# Patient Record
Sex: FEMALE | Race: WHITE | HISPANIC OR LATINO | Employment: UNEMPLOYED | URBAN - METROPOLITAN AREA
[De-identification: names, ages, dates, MRNs, and addresses within clinical notes are randomized per-mention and may not be internally consistent; named-entity substitution may affect disease eponyms.]

---

## 2023-06-14 ENCOUNTER — TRANSFERRED RECORDS (OUTPATIENT)
Dept: MULTI SPECIALTY CLINIC | Facility: CLINIC | Age: 63
End: 2023-06-14

## 2023-07-12 ENCOUNTER — OFFICE VISIT (OUTPATIENT)
Dept: FAMILY MEDICINE | Facility: OTHER | Age: 63
End: 2023-07-12
Attending: NURSE PRACTITIONER
Payer: COMMERCIAL

## 2023-07-12 VITALS
WEIGHT: 181.3 LBS | DIASTOLIC BLOOD PRESSURE: 82 MMHG | TEMPERATURE: 98 F | RESPIRATION RATE: 14 BRPM | SYSTOLIC BLOOD PRESSURE: 134 MMHG | OXYGEN SATURATION: 98 % | HEART RATE: 75 BPM

## 2023-07-12 DIAGNOSIS — R10.2 SUPRAPUBIC DISCOMFORT: Primary | ICD-10-CM

## 2023-07-12 LAB
ALBUMIN UR-MCNC: NEGATIVE MG/DL
APPEARANCE UR: CLEAR
BILIRUB UR QL STRIP: NEGATIVE
COLOR UR AUTO: ABNORMAL
GLUCOSE UR STRIP-MCNC: NEGATIVE MG/DL
HGB UR QL STRIP: ABNORMAL
HYALINE CASTS: 4 /LPF
KETONES UR STRIP-MCNC: NEGATIVE MG/DL
LEUKOCYTE ESTERASE UR QL STRIP: NEGATIVE
MUCOUS THREADS #/AREA URNS LPF: PRESENT /LPF
NITRATE UR QL: NEGATIVE
PH UR STRIP: 5 [PH] (ref 5–9)
RBC URINE: 1 /HPF
SP GR UR STRIP: 1.02 (ref 1–1.03)
UROBILINOGEN UR STRIP-MCNC: NORMAL MG/DL
WBC URINE: 1 /HPF

## 2023-07-12 PROCEDURE — 99203 OFFICE O/P NEW LOW 30 MIN: CPT

## 2023-07-12 PROCEDURE — 87086 URINE CULTURE/COLONY COUNT: CPT | Mod: ZL

## 2023-07-12 PROCEDURE — 81001 URINALYSIS AUTO W/SCOPE: CPT | Mod: ZL

## 2023-07-12 RX ORDER — MULTIVITAMIN/FOLIC ACID/BIOTIN 400-2000
TABLET ORAL DAILY
COMMUNITY
Start: 2023-03-06

## 2023-07-12 RX ORDER — LISINOPRIL AND HYDROCHLOROTHIAZIDE 12.5; 2 MG/1; MG/1
1 TABLET ORAL DAILY
COMMUNITY
Start: 2022-01-07

## 2023-07-12 ASSESSMENT — PAIN SCALES - GENERAL: PAINLEVEL: MODERATE PAIN (4)

## 2023-07-12 NOTE — PROGRESS NOTES
ASSESSMENT/PLAN:    I have reviewed the nursing notes.  I have reviewed the findings, diagnosis, plan and need for follow up with the patient.    1. Suprapubic discomfort  - UA with Microscopic reflex to Culture  - Urine Culture Aerobic Bacterial    Patient presents with suprapubic discomfort.  Her vitals are stable and patient appears nontoxic.  Urinalysis was negative for any signs of infection, discussed with patient in clinic.  Will send urine for culture and will notify patient of results.  If urine culture shows bacterial growth then will send in a prescription for an antibiotic based on culture and sensitivity.  Advised patient that her symptoms could be due to overactive bladder, increase fluid intake, or caffeine intake.  Advised patient to push fluids and may take Tylenol and ibuprofen as needed.  Advised patient that she can also try Azo.  Advised patient that she should avoid caffeine for the next few days to see if this helps improve her symptoms.    Discussed warning signs/symptoms indicative of need to f/u    Follow up if symptoms persist or worsen or concerns    I explained my diagnostic considerations and recommendations to the patient, who voiced understanding and agreement with the treatment plan. All questions were answered. We discussed potential side effects of any prescribed or recommended therapies, as well as expectations for response to treatments.    Nick Bojorquez, JUAN RAMON CNP  7/12/2023  5:30 PM    HPI:    Leisa Pinedo is a 63 year old female  who presents to [facility] today for concerns of urinary symptoms    Patient presents with concerns of possible UTI, x 1 day    Symptoms: urgency, frequency, suprapubic pain and pressure, back pain and nausea  Flank Pain or Back Pain: Yes: middle of back   Blood in Urine: No  Last Urination: in clinic  Able to Completely Urinate/Void: Yes  Prior UTIs: Yes: last one about 4-5 years ago  Exposures to STIs/STDs: No  Fevers, chills, N/V/D: Yes:  nausea  Change in Bowel Habits: No  Vaginal Symptoms or Discharge: No  Recent swimming/use of hot tubs/swimming pools/lakes: No    Treatments tried: none    Denies CP, SOB, calf tenderness. No new medications. Denies exposure to ill or COVID positive patients.     Allergies: perfume    PCP: Dr. Rl Lopez    History reviewed. No pertinent past medical history.  History reviewed. No pertinent surgical history.  Social History     Tobacco Use     Smoking status: Former     Types: Cigarettes     Smokeless tobacco: Never   Substance Use Topics     Alcohol use: Yes     Comment: occ     Current Outpatient Medications   Medication Sig Dispense Refill     calcium carbonate (OS-STACEY) 1500 (600 Ca) MG tablet        esomeprazole (NEXIUM) 20 MG DR capsule 20 mg       lisinopril-hydrochlorothiazide (ZESTORETIC) 20-12.5 MG tablet Take 1 tablet by mouth daily       Multiple Vitamin (HEALTHY HAIR/SKIN/NAILS) TABS daily       Multiple Vitamin (MULTIVITAMIN ADULT PO)        Allergies   Allergen Reactions     Perfume Headache     Past medical history, past surgical history, current medications and allergies reviewed and accurate to the best of my knowledge.      ROS:  Refer to HPI    /82   Pulse 75   Temp 98  F (36.7  C) (Tympanic)   Resp 14   Wt 82.2 kg (181 lb 4.8 oz)   SpO2 98%   Breastfeeding No     EXAM:  General Appearance: Well appearing 63 year old female, appropriate appearance for age. No acute distress   Respiratory: normal chest wall and respirations.  Normal effort.  Clear to auscultation bilaterally, no wheezing, crackles or rhonchi.  No increased work of breathing.  No cough appreciated.  Cardiac: RRR with no murmurs  Abdomen: soft, nontender, no rigidity, no rebound tenderness or guarding, normal bowel sounds present  : Mild suprapubic tenderness to palpation.  Absent CVA tenderness to palpation.    Neuro: Alert and oriented to person, place, and time.    Psychological: normal affect, alert, oriented,  and pleasant.     Labs:  Results for orders placed or performed in visit on 07/12/23   UA with Microscopic reflex to Culture     Status: Abnormal    Specimen: Urine, Clean Catch   Result Value Ref Range    Color Urine Light Yellow Colorless, Straw, Light Yellow, Yellow    Appearance Urine Clear Clear    Glucose Urine Negative Negative mg/dL    Bilirubin Urine Negative Negative    Ketones Urine Negative Negative mg/dL    Specific Gravity Urine 1.016 1.000 - 1.030    Blood Urine Trace (A) Negative    pH Urine 5.0 5.0 - 9.0    Protein Albumin Urine Negative Negative mg/dL    Urobilinogen Urine Normal Normal, 2.0 mg/dL    Nitrite Urine Negative Negative    Leukocyte Esterase Urine Negative Negative    Mucus Urine Present (A) None Seen /LPF    RBC Urine 1 <=2 /HPF    WBC Urine 1 <=5 /HPF    Hyaline Casts Urine 4 (H) <=2 /LPF    Narrative    Urine Culture not indicated

## 2023-07-12 NOTE — NURSING NOTE
Chief Complaint   Patient presents with     Abdominal Cramping     Back Pain     Lower      Patient is here for possible UTI. She states she has abdominal cramping and low back pain. Denies dysuria and fever.     Initial /82   Pulse 75   Temp 98  F (36.7  C) (Tympanic)   Resp 14   Wt 82.2 kg (181 lb 4.8 oz)   SpO2 98%   Breastfeeding No  There is no height or weight on file to calculate BMI.  Medication Reconciliation: complete    Kiesha Tamayo CMA       FOOD SECURITY SCREENING QUESTIONS:    The next two questions are to help us understand your food security.  If you are feeling you need any assistance in this area, we have resources available to support you today.    Hunger Vital Signs:  Within the past 12 months we worried whether our food would run out before we got money to buy more. Never  Within the past 12 months the food we bought just didn't last and we didn't have money to get more. Never  Kiesha Tamayo CMA,LPN on 7/12/2023 at 5:20 PM

## 2023-07-15 LAB — BACTERIA UR CULT: NORMAL

## 2023-07-20 LAB — PAP-ABSTRACT: NORMAL
